# Patient Record
Sex: FEMALE | Race: WHITE | NOT HISPANIC OR LATINO
[De-identification: names, ages, dates, MRNs, and addresses within clinical notes are randomized per-mention and may not be internally consistent; named-entity substitution may affect disease eponyms.]

---

## 2017-01-23 ENCOUNTER — APPOINTMENT (OUTPATIENT)
Dept: ORTHOPEDIC SURGERY | Facility: CLINIC | Age: 62
End: 2017-01-23

## 2017-01-23 VITALS — HEIGHT: 62 IN | BODY MASS INDEX: 34.96 KG/M2 | WEIGHT: 190 LBS

## 2017-03-06 ENCOUNTER — APPOINTMENT (OUTPATIENT)
Dept: ORTHOPEDIC SURGERY | Facility: CLINIC | Age: 62
End: 2017-03-06

## 2017-06-15 ENCOUNTER — APPOINTMENT (OUTPATIENT)
Dept: ORTHOPEDIC SURGERY | Facility: CLINIC | Age: 62
End: 2017-06-15

## 2017-06-15 DIAGNOSIS — M75.41 IMPINGEMENT SYNDROME OF RIGHT SHOULDER: ICD-10-CM

## 2017-06-15 DIAGNOSIS — M75.121 COMPLETE ROTATOR CUFF TEAR OR RUPTURE OF RIGHT SHOULDER, NOT SPECIFIED AS TRAUMATIC: ICD-10-CM

## 2017-06-15 DIAGNOSIS — M77.11 LATERAL EPICONDYLITIS, RIGHT ELBOW: ICD-10-CM

## 2017-06-27 PROBLEM — M77.11 RIGHT LATERAL EPICONDYLITIS: Status: ACTIVE | Noted: 2017-06-27

## 2021-02-24 ENCOUNTER — APPOINTMENT (OUTPATIENT)
Dept: CARDIOLOGY | Facility: CLINIC | Age: 66
End: 2021-02-24
Payer: MEDICARE

## 2021-02-24 ENCOUNTER — NON-APPOINTMENT (OUTPATIENT)
Age: 66
End: 2021-02-24

## 2021-02-24 VITALS
BODY MASS INDEX: 33.49 KG/M2 | SYSTOLIC BLOOD PRESSURE: 128 MMHG | HEIGHT: 62 IN | HEART RATE: 66 BPM | DIASTOLIC BLOOD PRESSURE: 78 MMHG | RESPIRATION RATE: 16 BRPM | WEIGHT: 182 LBS

## 2021-02-24 PROCEDURE — 93000 ELECTROCARDIOGRAM COMPLETE: CPT

## 2021-02-24 PROCEDURE — 93224 XTRNL ECG REC UP TO 48 HRS: CPT

## 2021-02-24 PROCEDURE — 99203 OFFICE O/P NEW LOW 30 MIN: CPT

## 2021-02-26 ENCOUNTER — NON-APPOINTMENT (OUTPATIENT)
Age: 66
End: 2021-02-26

## 2021-03-02 ENCOUNTER — NON-APPOINTMENT (OUTPATIENT)
Age: 66
End: 2021-03-02

## 2021-04-05 ENCOUNTER — APPOINTMENT (OUTPATIENT)
Dept: CARDIOLOGY | Facility: CLINIC | Age: 66
End: 2021-04-05
Payer: MEDICARE

## 2021-04-05 VITALS
SYSTOLIC BLOOD PRESSURE: 128 MMHG | WEIGHT: 175 LBS | DIASTOLIC BLOOD PRESSURE: 79 MMHG | RESPIRATION RATE: 15 BRPM | OXYGEN SATURATION: 98 % | HEIGHT: 62 IN | TEMPERATURE: 97.7 F | HEART RATE: 70 BPM | BODY MASS INDEX: 32.2 KG/M2

## 2021-04-05 PROCEDURE — 99214 OFFICE O/P EST MOD 30 MIN: CPT

## 2021-04-05 NOTE — DISCUSSION/SUMMARY
[FreeTextEntry1] : This is a 65-year-old female with past medical history significant for status post thyroidectomy, status post breast surgery with lymph node removal in 2001 followed by chemotherapy, status post hysterectomy, status post bilateral carpal tunnel surgery, status post cataract surgery, status post shoulder surgery, status post ankle surgery, currently complaining of palpitations, who comes in for cardiac follow-up evaluation.\par The patient had a Holter monitor done February 24, 2021 which revealed no significant arrhythmia or heart block.  She is still concerned about palpitations and will now have a 2-week event monitor done.\par She is encouraged to increase her hydration.\par She now understands her cardiovascular risk.  Blood work done February 24, 2021 demonstrated direct LDL cholesterol 150 mg/dL, total cholesterol 225 mg/dL, triglycerides 198 mg/dL, non-HDL cholesterol 182 mg/dL.\par She will start Crestor 20 mg/day.  She will have follow-up blood work in 4 to 6 weeks.\par She will also schedule echo Doppler examination to rule out mitral valve prolapse, and evaluate her valvular lesions.\par She was born in [place], and has no history of rheumatic fever.\par Her cardiac risk factors include positive family history of heart disease (her mother had a heart attack at age 67, and her brother and 2 sisters have diabetes).  \par Electrocardiogram done February 24, 2021 demonstrated normal sinus rhythm rate 66 bpm is otherwise unremarkable.\par PMH:\par The patient underwent a cardiac evaluation September 24, 2020.  The cardiologist at that time noted that the palpitations were associated with emotional stress.  He then recommended a variety of diagnostic testing which included a normal carotid Doppler examination; an echo Doppler examination demonstrated moderate left ventricular hypertrophy, mild mitral valve regurgitation, moderate mitral valve prolapse, aortic valve sclerosis with minimal aortic valve regurgitation, and mild tricuspid valve regurgitation, with normal left ventricular function.\par The patient had a Lexiscan pharmacologic nuclear stress test October 1, 2020 which demonstrated no significant myocardial ischemia.\par She understands she must maintain good hydration.  She is currently having at least 3 cups of tea a day with 1 to 2 glasses of water.  She understands she must increase her water intake.\par I would also like to review her lipid panel, given her cardiac risk profile and family history of heart disease.\par Her palpitations may be caused by her thyroid condition, and/or the effects of her mitral valve prolapse.\par \par She states that she has been having palpitations on and off "for a while" that has been occurring since her thyroid removal. She denies chest pain and states that she tries to stay active by walking and biking. \par \par \par

## 2021-04-05 NOTE — REASON FOR VISIT
[Consultation] : a consultation regarding [Hyperlipidemia] : hyperlipidemia [Palpitations] : palpitations [FreeTextEntry1] : This is a 65 year year old female here today for cardiac evaluation. \par She has a past medical history significant for thyroidectomy, lymph node removal in her breast 2001 with chemo that she completed. \par \par Today she is feeling generally well and does not have any complaints at this time. \par She states that she has been having palpitations on and off "for a while" that has been occurring since her thyroid removal. She denies chest pain and states that she tries to stay active by walking and biking. \par She has a FH significant for mother who had MI at age 67 and father with liver ca. Her siblings are all dx with DM and HTN.\par \par

## 2021-04-05 NOTE — PHYSICAL EXAM
[General Appearance - Well Developed] : well developed [Normal Appearance] : normal appearance [Well Groomed] : well groomed [General Appearance - Well Nourished] : well nourished [No Deformities] : no deformities [General Appearance - In No Acute Distress] : no acute distress [Normal Conjunctiva] : the conjunctiva exhibited no abnormalities [Normal Oral Mucosa] : normal oral mucosa [Normal Oropharynx] : normal oropharynx [Normal Jugular Venous A Waves Present] : normal jugular venous A waves present [Normal Jugular Venous V Waves Present] : normal jugular venous V waves present [No Jugular Venous Judd A Waves] : no jugular venous judd A waves [Respiration, Rhythm And Depth] : normal respiratory rhythm and effort [Exaggerated Use Of Accessory Muscles For Inspiration] : no accessory muscle use [Auscultation Breath Sounds / Voice Sounds] : lungs were clear to auscultation bilaterally [Bowel Sounds] : normal bowel sounds [Abnormal Walk] : normal gait [Abdomen Soft] : soft [Gait - Sufficient For Exercise Testing] : the gait was sufficient for exercise testing [Nail Clubbing] : no clubbing of the fingernails [Cyanosis, Localized] : no localized cyanosis [Skin Color & Pigmentation] : normal skin color and pigmentation [Skin Turgor] : normal skin turgor [] : no rash [Oriented To Time, Place, And Person] : oriented to person, place, and time [Affect] : the affect was normal [Mood] : the mood was normal [No Anxiety] : not feeling anxious [5th Left ICS - MCL] : palpated at the 5th LICS in the midclavicular line [Normal] : normal [No Precordial Heave] : no precordial heave was noted [Normal Rate] : normal [Rhythm Regular] : regular [Normal S1] : normal S1 [Normal S2] : normal S2 [No Gallop] : no gallop heard [I] : a grade 1 [2+] : left 2+ [No Abnormalities] : the abdominal aorta was not enlarged and no bruit was heard [No Pitting Edema] : no pitting edema present [S3] : no S3 [S4] : no S4 [Right Carotid Bruit] : no bruit heard over the right carotid [Left Carotid Bruit] : no bruit heard over the left carotid [Right Femoral Bruit] : no bruit heard over the right femoral artery [Left Femoral Bruit] : no bruit heard over the left femoral artery

## 2021-04-05 NOTE — ASSESSMENT
[FreeTextEntry1] : This is a 65 year year old female here today for cardiac evaluation. \par She has a past medical history significant for thyroidectomy, lymph node removal in her breast 2001 with chemo that she completed. \par \par Today she is feeling generally well and does not have any complaints at this time. \par She states that she has been having palpitations on and off "for a while" that has been occurring since her thyroid removal. She denies chest pain and states that she tries to stay active by walking and biking. \par She has a FH significant for mother who had MI at age 67 and father with liver ca. Her siblings are all dx with DM and HTN.\par \par -EKG done Feb 24, 2021 which demonstrated regular sinus rhythm with nonspecific ST-T wave changes, BPM of 66 with 1st degree av block.\par The patient understands that aerobic exercises must be increased to minutes 4 times/week and a detailed discussion of lifestyle modification was done today. \par The patient has a good understanding of the diagnosis, treatment plan and lifestyle modification. \par \par

## 2021-04-20 ENCOUNTER — NON-APPOINTMENT (OUTPATIENT)
Age: 66
End: 2021-04-20

## 2021-05-04 ENCOUNTER — APPOINTMENT (OUTPATIENT)
Dept: CARDIOLOGY | Facility: CLINIC | Age: 66
End: 2021-05-04
Payer: MEDICARE

## 2021-05-04 PROCEDURE — 93248 EXT ECG>7D<15D REV&INTERPJ: CPT

## 2021-05-10 PROBLEM — E78.00 HYPERCHOLESTEROLEMIA: Status: ACTIVE | Noted: 2021-04-05

## 2021-05-10 PROBLEM — R00.2 PALPITATIONS: Status: ACTIVE | Noted: 2021-02-24

## 2021-05-10 PROBLEM — R01.1 MURMUR: Status: ACTIVE | Noted: 2021-02-24

## 2021-05-10 PROBLEM — I34.1 MITRAL VALVE PROLAPSE: Status: ACTIVE | Noted: 2021-02-24

## 2021-05-12 ENCOUNTER — APPOINTMENT (OUTPATIENT)
Dept: CARDIOLOGY | Facility: CLINIC | Age: 66
End: 2021-05-12
Payer: MEDICARE

## 2021-05-12 VITALS
RESPIRATION RATE: 15 BRPM | WEIGHT: 175 LBS | BODY MASS INDEX: 32.2 KG/M2 | HEART RATE: 68 BPM | DIASTOLIC BLOOD PRESSURE: 83 MMHG | OXYGEN SATURATION: 99 % | TEMPERATURE: 98 F | HEIGHT: 62 IN | SYSTOLIC BLOOD PRESSURE: 130 MMHG

## 2021-05-12 DIAGNOSIS — E78.00 PURE HYPERCHOLESTEROLEMIA, UNSPECIFIED: ICD-10-CM

## 2021-05-12 DIAGNOSIS — R01.1 CARDIAC MURMUR, UNSPECIFIED: ICD-10-CM

## 2021-05-12 DIAGNOSIS — R06.00 DYSPNEA, UNSPECIFIED: ICD-10-CM

## 2021-05-12 DIAGNOSIS — R00.2 PALPITATIONS: ICD-10-CM

## 2021-05-12 DIAGNOSIS — I34.1 NONRHEUMATIC MITRAL (VALVE) PROLAPSE: ICD-10-CM

## 2021-05-12 PROCEDURE — 93306 TTE W/DOPPLER COMPLETE: CPT

## 2021-05-12 PROCEDURE — ZZZZZ: CPT

## 2021-05-12 PROCEDURE — 93015 CV STRESS TEST SUPVJ I&R: CPT

## 2021-05-12 PROCEDURE — 99213 OFFICE O/P EST LOW 20 MIN: CPT | Mod: 25

## 2021-05-12 RX ORDER — ROSUVASTATIN CALCIUM 20 MG/1
20 TABLET, FILM COATED ORAL DAILY
Qty: 90 | Refills: 1 | Status: COMPLETED | COMMUNITY
Start: 2021-04-05 | End: 2021-05-12

## 2021-05-12 NOTE — ASSESSMENT
[FreeTextEntry1] : This is a 65 year year old female here today for cardiac evaluation. \par She has a past medical history significant for thyroidectomy, lymph node removal in her breast 2001 with chemo that she completed. \par She states that she has been having palpitations on and off "for a while" that has been occurring since her thyroid removal. She denies chest pain and states that she tries to stay active by walking and biking. \par She has a FH significant for mother who had MI at age 67 and father with liver ca. Her siblings are all dx with DM and HTN.\par \par -EKG done Feb 24, 2021 which demonstrated regular sinus rhythm with nonspecific ST-T wave changes, BPM of 66 with 1st degree av block.\par The patient understands that aerobic exercises must be increased to minutes 4 times/week and a detailed discussion of lifestyle modification was done today. \par The patient has a good understanding of the diagnosis, treatment plan and lifestyle modification. \par \par

## 2021-05-12 NOTE — PHYSICAL EXAM
[Well Developed] : well developed [Well Nourished] : well nourished [No Acute Distress] : no acute distress [Normal Venous Pressure] : normal venous pressure [No Carotid Bruit] : no carotid bruit [Normal S1, S2] : normal S1, S2 [No Rub] : no rub [Murmur] : murmur [Clear Lung Fields] : clear lung fields [Good Air Entry] : good air entry [No Respiratory Distress] : no respiratory distress  [Soft] : abdomen soft [Non Tender] : non-tender [No Masses/organomegaly] : no masses/organomegaly [Normal Bowel Sounds] : normal bowel sounds [Normal Gait] : normal gait [No Edema] : no edema [No Cyanosis] : no cyanosis [No Clubbing] : no clubbing [No Varicosities] : no varicosities [No Rash] : no rash [No Skin Lesions] : no skin lesions [Moves all extremities] : moves all extremities [No Focal Deficits] : no focal deficits [Normal Speech] : normal speech [Alert and Oriented] : alert and oriented [Normal memory] : normal memory [General Appearance - Well Developed] : well developed [Normal Appearance] : normal appearance [Well Groomed] : well groomed [No Deformities] : no deformities [General Appearance - Well Nourished] : well nourished [General Appearance - In No Acute Distress] : no acute distress [Normal Conjunctiva] : the conjunctiva exhibited no abnormalities [Normal Oral Mucosa] : normal oral mucosa [Normal Oropharynx] : normal oropharynx [Normal Jugular Venous A Waves Present] : normal jugular venous A waves present [Normal Jugular Venous V Waves Present] : normal jugular venous V waves present [No Jugular Venous Judd A Waves] : no jugular venous judd A waves [Respiration, Rhythm And Depth] : normal respiratory rhythm and effort [Exaggerated Use Of Accessory Muscles For Inspiration] : no accessory muscle use [Auscultation Breath Sounds / Voice Sounds] : lungs were clear to auscultation bilaterally [Bowel Sounds] : normal bowel sounds [Abdomen Soft] : soft [Abnormal Walk] : normal gait [Gait - Sufficient For Exercise Testing] : the gait was sufficient for exercise testing [Nail Clubbing] : no clubbing of the fingernails [Cyanosis, Localized] : no localized cyanosis [Skin Turgor] : normal skin turgor [Skin Color & Pigmentation] : normal skin color and pigmentation [] : no rash [Oriented To Time, Place, And Person] : oriented to person, place, and time [Affect] : the affect was normal [Mood] : the mood was normal [No Anxiety] : not feeling anxious [5th Left ICS - MCL] : palpated at the 5th LICS in the midclavicular line [Normal] : normal [No Precordial Heave] : no precordial heave was noted [Normal Rate] : normal [Rhythm Regular] : regular [Normal S1] : normal S1 [Normal S2] : normal S2 [No Gallop] : no gallop heard [I] : a grade 1 [2+] : left 2+ [No Abnormalities] : the abdominal aorta was not enlarged and no bruit was heard [No Pitting Edema] : no pitting edema present [Apical Thrill] : no thrill palpable at the apex [Click] : no click [Distant] : the heart sounds were ~L not distant [Pericardial Rub] : no pericardial rub [S3] : no S3 [S4] : no S4 [Right Carotid Bruit] : no bruit heard over the right carotid [Left Carotid Bruit] : no bruit heard over the left carotid [Right Femoral Bruit] : no bruit heard over the right femoral artery [Left Femoral Bruit] : no bruit heard over the left femoral artery

## 2021-05-12 NOTE — DISCUSSION/SUMMARY
[FreeTextEntry1] : This is a 66-year-old female with past medical history significant for status post thyroidectomy, status post breast surgery with lymph node removal in 2001 followed by chemotherapy, status post hysterectomy, status post bilateral carpal tunnel surgery, status post cataract surgery, status post shoulder surgery, status post ankle surgery, currently complaining of palpitations, who comes in for cardiac follow-up evaluation.\par The patient had a normal exercise stress test May 12, 2021.\par The patient was seen by her primary care physician in March 2021.  Blood work done at that time demonstrated cholesterol 182, HDL of 43, LDL calculated 108 mg/dL, and triglycerides 155 mg/dL.\par The patient never started her Crestor 20 mg/day, because her primary care physician based on these blood test told her she did not need lipid-lowering therapy.\par There is a clear difference between the results of the blood work in March and February 2021.  The patient will continue on her current diet and exercise program.  She will have new blood work done in July 2021 and further recommendations made at that time.\par The patient did have a 2-week continuous event monitor done April 5 through April 19, 2021 which demonstrated short burst of supraventricular tachycardia lasting for approximately 9 beats.  The patient reports no other palpitations, dizziness or lightheadedness.  At this time she will continue on her current medical therapy.  She understands he must contact me if she has any further episodes of palpitations.  She will return in July for lipid panel.\par She understands she must maintain good hydration.  She is currently hemodynamically stable and asymptomatic from a cardiac standpoint.\par \par The patient had a Holter monitor done February 24, 2021 which revealed no significant arrhythmia or heart block.  She is still concerned about palpitations and will now have a 2-week event monitor done.\par She is encouraged to increase her hydration.\par She now understands her cardiovascular risk.  Blood work done February 24, 2021 demonstrated direct LDL cholesterol 150 mg/dL, total cholesterol 225 mg/dL, triglycerides 198 mg/dL, non-HDL cholesterol 182 mg/dL.\par She will start Crestor 20 mg/day.  She will have follow-up blood work in 4 to 6 weeks.\par She will also schedule echo Doppler examination to rule out mitral valve prolapse, and evaluate her valvular lesions.\par She was born in [place], and has no history of rheumatic fever.\par Her cardiac risk factors include positive family history of heart disease (her mother had a heart attack at age 67, and her brother and 2 sisters have diabetes).  \par Electrocardiogram done February 24, 2021 demonstrated normal sinus rhythm rate 66 bpm is otherwise unremarkable.\par PMH:\par The patient underwent a cardiac evaluation September 24, 2020.  The cardiologist at that time noted that the palpitations were associated with emotional stress.  He then recommended a variety of diagnostic testing which included a normal carotid Doppler examination; an echo Doppler examination demonstrated moderate left ventricular hypertrophy, mild mitral valve regurgitation, moderate mitral valve prolapse, aortic valve sclerosis with minimal aortic valve regurgitation, and mild tricuspid valve regurgitation, with normal left ventricular function.\par The patient had a Lexiscan pharmacologic nuclear stress test October 1, 2020 which demonstrated no significant myocardial ischemia.\par She understands she must maintain good hydration.  She is currently having at least 3 cups of tea a day with 1 to 2 glasses of water.  She understands she must increase her water intake.\par I would also like to review her lipid panel, given her cardiac risk profile and family history of heart disease.\par Her palpitations may be caused by her thyroid condition, and/or the effects of her mitral valve prolapse.\par \par She states that she has been having palpitations on and off "for a while" that has been occurring since her thyroid removal. She denies chest pain and states that she tries to stay active by walking and biking. \par \par \par

## 2021-05-12 NOTE — REASON FOR VISIT
[CV Risk Factors and Non-Cardiac Disease] : CV risk factors and non-cardiac disease [Arrhythmia/ECG Abnorrmalities] : arrhythmia/ECG abnormalities [Consultation] : a consultation regarding [Hyperlipidemia] : hyperlipidemia [Palpitations] : palpitations [FreeTextEntry1] : murmur

## 2021-06-13 PROBLEM — R06.00 DOE (DYSPNEA ON EXERTION): Status: ACTIVE | Noted: 2021-05-10

## 2021-07-21 ENCOUNTER — APPOINTMENT (OUTPATIENT)
Dept: CARDIOLOGY | Facility: CLINIC | Age: 66
End: 2021-07-21

## 2023-05-13 ENCOUNTER — EMERGENCY (EMERGENCY)
Facility: HOSPITAL | Age: 68
LOS: 1 days | Discharge: HOME | End: 2023-05-13
Payer: MEDICARE

## 2023-05-13 VITALS
DIASTOLIC BLOOD PRESSURE: 71 MMHG | TEMPERATURE: 98 F | HEART RATE: 62 BPM | SYSTOLIC BLOOD PRESSURE: 127 MMHG | RESPIRATION RATE: 16 BRPM | OXYGEN SATURATION: 98 %

## 2023-05-13 VITALS
RESPIRATION RATE: 16 BRPM | OXYGEN SATURATION: 97 % | DIASTOLIC BLOOD PRESSURE: 68 MMHG | SYSTOLIC BLOOD PRESSURE: 99 MMHG | TEMPERATURE: 98 F | HEART RATE: 72 BPM

## 2023-05-13 DIAGNOSIS — Z90.11 ACQUIRED ABSENCE OF RIGHT BREAST AND NIPPLE: Chronic | ICD-10-CM

## 2023-05-13 DIAGNOSIS — Z98.890 OTHER SPECIFIED POSTPROCEDURAL STATES: Chronic | ICD-10-CM

## 2023-05-13 DIAGNOSIS — E89.0 POSTPROCEDURAL HYPOTHYROIDISM: Chronic | ICD-10-CM

## 2023-05-13 DIAGNOSIS — Z90.710 ACQUIRED ABSENCE OF BOTH CERVIX AND UTERUS: Chronic | ICD-10-CM

## 2023-05-13 DIAGNOSIS — Z98.1 ARTHRODESIS STATUS: Chronic | ICD-10-CM

## 2023-05-13 LAB
ALBUMIN SERPL ELPH-MCNC: 4.5 G/DL — SIGNIFICANT CHANGE UP (ref 3.3–5)
ALP SERPL-CCNC: 95 U/L — SIGNIFICANT CHANGE UP (ref 40–120)
ALT FLD-CCNC: 14 U/L — SIGNIFICANT CHANGE UP (ref 10–45)
ANION GAP SERPL CALC-SCNC: 14 MMOL/L — SIGNIFICANT CHANGE UP (ref 5–17)
APPEARANCE UR: CLEAR — SIGNIFICANT CHANGE UP
AST SERPL-CCNC: 10 U/L — SIGNIFICANT CHANGE UP (ref 10–40)
BACTERIA # UR AUTO: NEGATIVE — SIGNIFICANT CHANGE UP
BASOPHILS # BLD AUTO: 0.04 K/UL — SIGNIFICANT CHANGE UP (ref 0–0.2)
BASOPHILS NFR BLD AUTO: 1 % — SIGNIFICANT CHANGE UP (ref 0–2)
BILIRUB SERPL-MCNC: 0.4 MG/DL — SIGNIFICANT CHANGE UP (ref 0.2–1.2)
BILIRUB UR-MCNC: NEGATIVE — SIGNIFICANT CHANGE UP
BUN SERPL-MCNC: 20 MG/DL — SIGNIFICANT CHANGE UP (ref 7–23)
CALCIUM SERPL-MCNC: 8.8 MG/DL — SIGNIFICANT CHANGE UP (ref 8.4–10.5)
CHLORIDE SERPL-SCNC: 104 MMOL/L — SIGNIFICANT CHANGE UP (ref 96–108)
CO2 SERPL-SCNC: 22 MMOL/L — SIGNIFICANT CHANGE UP (ref 22–31)
COLOR SPEC: COLORLESS — SIGNIFICANT CHANGE UP
CREAT SERPL-MCNC: 0.66 MG/DL — SIGNIFICANT CHANGE UP (ref 0.5–1.3)
D DIMER BLD IA.RAPID-MCNC: 207 NG/ML DDU — SIGNIFICANT CHANGE UP
DIFF PNL FLD: NEGATIVE — SIGNIFICANT CHANGE UP
EGFR: 95 ML/MIN/1.73M2 — SIGNIFICANT CHANGE UP
EOSINOPHIL # BLD AUTO: 0.03 K/UL — SIGNIFICANT CHANGE UP (ref 0–0.5)
EOSINOPHIL NFR BLD AUTO: 0.7 % — SIGNIFICANT CHANGE UP (ref 0–6)
EPI CELLS # UR: 0 /HPF — SIGNIFICANT CHANGE UP
GLUCOSE SERPL-MCNC: 121 MG/DL — HIGH (ref 70–99)
GLUCOSE UR QL: NEGATIVE — SIGNIFICANT CHANGE UP
HCG SERPL-ACNC: 2.8 MIU/ML — SIGNIFICANT CHANGE UP
HCT VFR BLD CALC: 39.1 % — SIGNIFICANT CHANGE UP (ref 34.5–45)
HGB BLD-MCNC: 12.9 G/DL — SIGNIFICANT CHANGE UP (ref 11.5–15.5)
HYALINE CASTS # UR AUTO: 0 /LPF — SIGNIFICANT CHANGE UP (ref 0–2)
IMM GRANULOCYTES NFR BLD AUTO: 0.2 % — SIGNIFICANT CHANGE UP (ref 0–0.9)
KETONES UR-MCNC: NEGATIVE — SIGNIFICANT CHANGE UP
LEUKOCYTE ESTERASE UR-ACNC: NEGATIVE — SIGNIFICANT CHANGE UP
LIDOCAIN IGE QN: 52 U/L — SIGNIFICANT CHANGE UP (ref 7–60)
LYMPHOCYTES # BLD AUTO: 1.36 K/UL — SIGNIFICANT CHANGE UP (ref 1–3.3)
LYMPHOCYTES # BLD AUTO: 33.3 % — SIGNIFICANT CHANGE UP (ref 13–44)
MCHC RBC-ENTMCNC: 28.9 PG — SIGNIFICANT CHANGE UP (ref 27–34)
MCHC RBC-ENTMCNC: 33 GM/DL — SIGNIFICANT CHANGE UP (ref 32–36)
MCV RBC AUTO: 87.7 FL — SIGNIFICANT CHANGE UP (ref 80–100)
MONOCYTES # BLD AUTO: 0.36 K/UL — SIGNIFICANT CHANGE UP (ref 0–0.9)
MONOCYTES NFR BLD AUTO: 8.8 % — SIGNIFICANT CHANGE UP (ref 2–14)
NEUTROPHILS # BLD AUTO: 2.29 K/UL — SIGNIFICANT CHANGE UP (ref 1.8–7.4)
NEUTROPHILS NFR BLD AUTO: 56 % — SIGNIFICANT CHANGE UP (ref 43–77)
NITRITE UR-MCNC: NEGATIVE — SIGNIFICANT CHANGE UP
NRBC # BLD: 0 /100 WBCS — SIGNIFICANT CHANGE UP (ref 0–0)
PH UR: 6.5 — SIGNIFICANT CHANGE UP (ref 5–8)
PLATELET # BLD AUTO: 153 K/UL — SIGNIFICANT CHANGE UP (ref 150–400)
POTASSIUM SERPL-MCNC: 4.1 MMOL/L — SIGNIFICANT CHANGE UP (ref 3.5–5.3)
POTASSIUM SERPL-SCNC: 4.1 MMOL/L — SIGNIFICANT CHANGE UP (ref 3.5–5.3)
PROT SERPL-MCNC: 6.8 G/DL — SIGNIFICANT CHANGE UP (ref 6–8.3)
PROT UR-MCNC: NEGATIVE — SIGNIFICANT CHANGE UP
RBC # BLD: 4.46 M/UL — SIGNIFICANT CHANGE UP (ref 3.8–5.2)
RBC # FLD: 13.1 % — SIGNIFICANT CHANGE UP (ref 10.3–14.5)
RBC CASTS # UR COMP ASSIST: 0 /HPF — SIGNIFICANT CHANGE UP (ref 0–4)
SODIUM SERPL-SCNC: 140 MMOL/L — SIGNIFICANT CHANGE UP (ref 135–145)
SP GR SPEC: 1.01 — SIGNIFICANT CHANGE UP (ref 1.01–1.02)
UROBILINOGEN FLD QL: NEGATIVE — SIGNIFICANT CHANGE UP
WBC # BLD: 4.09 K/UL — SIGNIFICANT CHANGE UP (ref 3.8–10.5)
WBC # FLD AUTO: 4.09 K/UL — SIGNIFICANT CHANGE UP (ref 3.8–10.5)
WBC UR QL: 2 /HPF — SIGNIFICANT CHANGE UP (ref 0–5)

## 2023-05-13 PROCEDURE — 83735 ASSAY OF MAGNESIUM: CPT

## 2023-05-13 PROCEDURE — 99285 EMERGENCY DEPT VISIT HI MDM: CPT | Mod: FS

## 2023-05-13 PROCEDURE — 87086 URINE CULTURE/COLONY COUNT: CPT

## 2023-05-13 PROCEDURE — 83690 ASSAY OF LIPASE: CPT

## 2023-05-13 PROCEDURE — 80053 COMPREHEN METABOLIC PANEL: CPT

## 2023-05-13 PROCEDURE — 85379 FIBRIN DEGRADATION QUANT: CPT

## 2023-05-13 PROCEDURE — 81001 URINALYSIS AUTO W/SCOPE: CPT

## 2023-05-13 PROCEDURE — 99285 EMERGENCY DEPT VISIT HI MDM: CPT | Mod: 25

## 2023-05-13 PROCEDURE — 85025 COMPLETE CBC W/AUTO DIFF WBC: CPT

## 2023-05-13 PROCEDURE — 71046 X-RAY EXAM CHEST 2 VIEWS: CPT | Mod: 26

## 2023-05-13 PROCEDURE — 84702 CHORIONIC GONADOTROPIN TEST: CPT

## 2023-05-13 PROCEDURE — 93005 ELECTROCARDIOGRAM TRACING: CPT

## 2023-05-13 PROCEDURE — 84484 ASSAY OF TROPONIN QUANT: CPT

## 2023-05-13 PROCEDURE — 71046 X-RAY EXAM CHEST 2 VIEWS: CPT

## 2023-05-13 RX ORDER — LIDOCAINE 4 G/100G
1 CREAM TOPICAL ONCE
Refills: 0 | Status: COMPLETED | OUTPATIENT
Start: 2023-05-13 | End: 2023-05-13

## 2023-05-13 RX ORDER — IBUPROFEN 200 MG
400 TABLET ORAL ONCE
Refills: 0 | Status: COMPLETED | OUTPATIENT
Start: 2023-05-13 | End: 2023-05-13

## 2023-05-13 RX ORDER — ACETAMINOPHEN 500 MG
975 TABLET ORAL ONCE
Refills: 0 | Status: COMPLETED | OUTPATIENT
Start: 2023-05-13 | End: 2023-05-13

## 2023-05-13 RX ADMIN — Medication 975 MILLIGRAM(S): at 09:02

## 2023-05-13 RX ADMIN — LIDOCAINE 1 PATCH: 4 CREAM TOPICAL at 09:02

## 2023-05-13 RX ADMIN — Medication 400 MILLIGRAM(S): at 11:06

## 2023-05-13 NOTE — ED PROVIDER NOTE - SKIN, MLM
Skin normal color for race, warm, dry and intact. No evidence of rash. Right breast s/p mastectomy wnl. Left breast soft, no palpable masses (Chaperone CRYSTAL Dee)

## 2023-05-13 NOTE — ED ADULT NURSE NOTE - OBJECTIVE STATEMENT
Pt is a 67 yo female sp left sided chest/ breast pain. Pt states the pain came on suddenly when she was cooking yesterday, she reports having episodes like this in the past but will usually resolve after 1-2 hours. Pt states that she was up throughout the night due to the pain. Pt reports the pain on the left chest wall underneath her breast area. Pt denies taking any pain medication at home. She denies any recent trauma or injury to the area. Pt denies any SOB no N/V/D no cough fever or chills. Pt denies any other complaints at this time.

## 2023-05-13 NOTE — ED PROVIDER NOTE - OBJECTIVE STATEMENT
68 female history of breast cancer s/p right mastectomy, thyroid cancer s/p thyroidectomy on Synthroid presenting with left flank pain.  Patient reports approximately 16 hours of constant, nonradiating, localized sharp, needlelike pain to left side of chest wall.  Cannot identify alleviating or exacerbating factors.  No OTC meds taken at home.  Began when she was cooking, has not been able to alleviate. Denies trauma. Reports she has had this pain prior, last work-up was several months ago, unknown results.  Denies associated symptoms such as SOB, pain with inspiration, dizziness, back pain, LOC, palpitations, nausea, vomiting, diarrhea, dysuria, urinary frequency, hematuria, rash, fevers, chills 68 female history of breast cancer s/p right mastectomy, thyroid cancer s/p thyroidectomy on Synthroid presenting with left flank pain.  Patient reports approximately 16 hours of constant, nonradiating, localized sharp, needlelike pain to left side of chest wall.  Cannot identify alleviating or exacerbating factors.  No OTC meds taken at home.  Began when she was cooking, has not been able to alleviate. Denies trauma. Reports she has had this pain prior, last work-up was several months ago, unknown results.  Denies associated symptoms such as SOB, pain with inspiration, dizziness, back pain, LOC, palpitations, nausea, vomiting, diarrhea, dysuria, urinary frequency, hematuria, rash, fevers, chills  Obey Travis at bedside for Micronesian translation throughout visit

## 2023-05-13 NOTE — ED PROVIDER NOTE - NSFOLLOWUPINSTRUCTIONS_ED_ALL_ED_FT
Please follow up with your primary care doctor within 1 week.  *Bring all printed lab/test results to your appointment(s).*    Take ibuprofen 400-600mg every 6 hrs as needed for pain. Take with food  Take acetaminophen 500-1000mg every 6 hrs as needed for pain. DO NOT EXCEED 4000mg DAILY.    Continue all other home medications as prescribed.    Return to the Emergency Department for worsening pain, shortness of breath, dizziness, palpitations, multiple episodes of vomiting, fever, or any other concerns.

## 2023-05-13 NOTE — ED PROVIDER NOTE - NS ED ATTENDING STATEMENT MOD
This was a shared visit with the FELICIANO. I reviewed and verified the documentation and independently performed the documented:

## 2023-05-13 NOTE — ED PROVIDER NOTE - PATIENT PORTAL LINK FT
You can access the FollowMyHealth Patient Portal offered by [facility] by registering at the following website: [email or web address]. By joining [facility]’s FollowMyHealth portal, you will also be able to view your health information using other applications (apps) compatible with our system.

## 2023-05-13 NOTE — ED PROVIDER NOTE - NSICDXPASTMEDICALHX_GEN_ALL_CORE_FT
PAST MEDICAL HISTORY:  Cancer of breast right    Cancer of thyroid     Complete rotator cuff tear or rupture of right shoulder, not specified as traumatic     Hypothyroidism

## 2023-05-13 NOTE — ED ADULT NURSE NOTE - NSICDXPASTSURGICALHX_GEN_ALL_CORE_FT
PAST SURGICAL HISTORY:  H/O ankle fusion right - 2013    H/O thyroidectomy 2008 - thryoid cancer    H/O: hysterectomy 1993    History of hand surgery carpal tunnel - both hands 19911    History of lumpectomy of right breast 2001- breast cancer

## 2023-05-13 NOTE — ED PROVIDER NOTE - ATTENDING APP SHARED VISIT CONTRIBUTION OF CARE
MD Abad:  patient seen and evaluated with the PA.  I was present for key portions of the History and Physical, and I agree with the Impression and Plan.      Patient is a 68-year-old female complaining of pain in the left mid axillary line; onset 4 PM yesterday, progressively worse over the last 16 hours.    Quality is sharp.  pain is slightly worse with left upper extremity shoulder abduction  Medical history significant for thyroid cancer, breast cancer; remote treated.    Associated symptoms: No associated shortness of breath, fevers, chills, no leg swelling, no rash    Vital signs: Within normal limits  Gen: Well appearing F in NAD.  Head: NC/AT.   PERRL, EOMI.  Neck: trachea midline, supple.  Resp:  No distress, CTA B.  Cardiac RRR, no RMG.    Chest wall: No rash, + left chest wall point tenderness to palpation sixth intercostal space mid axillary line.  Abdomen:  soft, nondistended, nontender; no R/G.  Ext: no deformities, no edema.  Neuro:  A&Ox4 appears non focal. Skin:  Warm and dry as visualized, no rash.   Psych:  Normal affect and mood.    ECG directly visualized by me and shows rate 69, , QRS 86, QTc 475.  No ST elevations or depressions    Medical decision making: Differential diagnosis includes ACS, remote possibility PE, although D-dimer less than 229, and ECG is within normal limits.  Patient has normal heart rate.  No cough, no pleurisy.  CTA chest not indicated at this time.  High-sensitivity troponin 8, which in light of 16 hours constant pain makes acute coronary syndrome less likely at this time.  Lipase, liver function, renal function, CBC all within normal limits.    Suspicion for zoster is low given no cutaneous evidence of rash.  However it is still possible that this could be early zoster and the cutaneous findings have yet to manifest.  Empiric acyclovir or valacyclovir not indicated at this time     if urinalysis unremarkable anticipate the patient can be discharged on oral NSAIDs and instructed to follow-up with PMD, strict return precautions. MD Abad:  patient seen and evaluated with the PA.  I was present for key portions of the History and Physical, and I agree with the Impression and Plan.      Patient is a 68-year-old female complaining of pain in the left mid axillary line; onset 4 PM yesterday, progressively worse over the last 16 hours.    Quality is sharp.  pain is slightly worse with left upper extremity shoulder abduction  Medical history significant for thyroid cancer, breast cancer; remote treated.    Associated symptoms: No associated shortness of breath, fevers, chills, no leg swelling, no rash    Vital signs: Within normal limits  Gen: Well appearing F in NAD.  Head: NC/AT.   PERRL, EOMI.  Neck: trachea midline, supple.  Resp:  No distress, CTA B.  Cardiac RRR, no RMG.    Chest wall: No rash, + left chest wall point tenderness to palpation sixth intercostal space mid axillary line.  Abdomen:  soft, nondistended, nontender; no R/G.  Ext: no deformities, no edema.  Neuro:  A&Ox4 appears non focal. Skin:  Warm and dry as visualized, no rash.   Psych:  Normal affect and mood.    ECG directly visualized by me and shows rate 69, , QRS 86, QTc 475.  No ST elevations or depressions    Medical decision making: Differential diagnosis includes ACS, remote possibility PE.  D-dimer less than 229, and ECG is within normal limits.  Patient has normal heart rate.  No cough, no pleurisy.  CTA chest not indicated at this time.  High-sensitivity troponin 8, which in light of 16 hours constant pain makes acute coronary syndrome less likely at this time.  Lipase, liver function, renal function, CBC all within normal limits.    Suspicion for zoster is low given no cutaneous evidence of rash.  However it is still possible that this could be early zoster and the cutaneous findings have yet to manifest.  Empiric acyclovir or valacyclovir not indicated at this time     if urinalysis unremarkable anticipate the patient can be discharged on oral NSAIDs and instructed to follow-up with PMD, strict return precautions.

## 2023-05-13 NOTE — ED PROVIDER NOTE - CONSTITUTIONAL, MLM
normal... Well appearing, awake, alert, oriented to person, place, time/situation and in no apparent distress. Cephalexin Counseling: I counseled the patient regarding use of cephalexin as an antibiotic for prophylactic and/or therapeutic purposes. Cephalexin (commonly prescribed under brand name Keflex) is a cephalosporin antibiotic which is active against numerous classes of bacteria, including most skin bacteria. Side effects may include nausea, diarrhea, gastrointestinal upset, rash, hives, yeast infections, and in rare cases, hepatitis, kidney disease, seizures, fever, confusion, neurologic symptoms, and others. Patients with severe allergies to penicillin medications are cautioned that there is about a 10% incidence of cross-reactivity with cephalosporins. When possible, patients with penicillin allergies should use alternatives to cephalosporins for antibiotic therapy.

## 2023-05-13 NOTE — ED PROVIDER NOTE - MUSCULOSKELETAL, MLM
Spine appears normal, range of motion is not limited, no muscle or joint tenderness. No focal chest wall ttp/deformity to area of pain

## 2023-05-14 LAB
CULTURE RESULTS: SIGNIFICANT CHANGE UP
SPECIMEN SOURCE: SIGNIFICANT CHANGE UP